# Patient Record
Sex: FEMALE | Race: WHITE | Employment: UNEMPLOYED | URBAN - METROPOLITAN AREA
[De-identification: names, ages, dates, MRNs, and addresses within clinical notes are randomized per-mention and may not be internally consistent; named-entity substitution may affect disease eponyms.]

---

## 2017-04-16 ENCOUNTER — OFFICE VISIT (OUTPATIENT)
Dept: FAMILY MEDICINE CLINIC | Facility: CLINIC | Age: 52
End: 2017-04-16

## 2017-04-16 VITALS
RESPIRATION RATE: 14 BRPM | TEMPERATURE: 98 F | DIASTOLIC BLOOD PRESSURE: 70 MMHG | WEIGHT: 155 LBS | HEART RATE: 61 BPM | BODY MASS INDEX: 24.05 KG/M2 | HEIGHT: 67.5 IN | OXYGEN SATURATION: 98 % | SYSTOLIC BLOOD PRESSURE: 110 MMHG

## 2017-04-16 DIAGNOSIS — R30.0 DYSURIA: Primary | ICD-10-CM

## 2017-04-16 DIAGNOSIS — N39.0 URINARY TRACT INFECTION, SITE UNSPECIFIED: ICD-10-CM

## 2017-04-16 PROCEDURE — 99202 OFFICE O/P NEW SF 15 MIN: CPT | Performed by: PHYSICIAN ASSISTANT

## 2017-04-16 PROCEDURE — 87086 URINE CULTURE/COLONY COUNT: CPT | Performed by: PHYSICIAN ASSISTANT

## 2017-04-16 PROCEDURE — 81003 URINALYSIS AUTO W/O SCOPE: CPT | Performed by: PHYSICIAN ASSISTANT

## 2017-04-16 RX ORDER — CIPROFLOXACIN 500 MG/1
500 TABLET, FILM COATED ORAL 2 TIMES DAILY
Qty: 10 TABLET | Refills: 0 | Status: SHIPPED | OUTPATIENT
Start: 2017-04-16 | End: 2017-04-21

## 2017-04-16 NOTE — PROGRESS NOTES
CHIEF COMPLAINT:   Patient presents with:  UTI: 4 days    HPI:   Bhavana Milton is a 45year old female who presents with symptoms of UTI. Complaining of urinary frequency, urgency, dysuria for last 4 days. Symptoms have been worsening since onset.   Treatm GRAVITY <=1.005 1.005 - 1.030   OCCULT BLOOD Large Negative   PH, URINE 5.0 4.5 - 8.0   PROTEIN (URINE DIPSTICK) 30 Negative/Trace mg/dL   UROBILINOGEN,SEMI-QN 0.2 0.0 - 1.9 mg/dL   NITRITE, URINE Negative Negative   LEUKOCYTES Small Negative   APPEARANCE area around your vagina when you bathe or shower. Wear cotton underwear. Use pantyhose with cotton crotches. Avoid tight clothing. Wear loose pants. Do not wear a wet bathing suit for a long time.

## 2017-04-16 NOTE — PATIENT INSTRUCTIONS
We will send out a urine culture and contact you with results  Please follow up with your PCP if no improvement within 5-7 days. Go directly to the ER for any acute worsening of symptoms. · Complete full course of antibiotics.   · Over the counter Tylenol

## (undated) NOTE — MR AVS SNAPSHOT
[id number] [facility] [address]  [place] [id number] [phone]               Thank you for choosing us for your health care visit with Krystina Foss PA-C.   We are glad to serve you and happy to provide you with this summary of your BP Pulse Temp Height Weight BMI    110/70 mmHg 61 98 °F (36.7 °C) (Oral) 67. 5\" 155 lb 23.90 kg/m2         Current Medications          This list is accurate as of: 4/16/17  9:50 AM.  Always use your most recent med list.                Ciprofloxacin HCl